# Patient Record
Sex: MALE | Race: ASIAN | NOT HISPANIC OR LATINO | URBAN - METROPOLITAN AREA
[De-identification: names, ages, dates, MRNs, and addresses within clinical notes are randomized per-mention and may not be internally consistent; named-entity substitution may affect disease eponyms.]

---

## 2017-07-24 ENCOUNTER — EMERGENCY (EMERGENCY)
Facility: HOSPITAL | Age: 22
LOS: 1 days | Discharge: HOME | End: 2017-07-24
Attending: EMERGENCY MEDICINE | Admitting: EMERGENCY MEDICINE
Payer: MEDICAID

## 2017-07-24 VITALS
TEMPERATURE: 98 F | OXYGEN SATURATION: 100 % | HEART RATE: 74 BPM | DIASTOLIC BLOOD PRESSURE: 73 MMHG | RESPIRATION RATE: 16 BRPM | SYSTOLIC BLOOD PRESSURE: 134 MMHG

## 2017-07-24 PROCEDURE — 10120 INC&RMVL FB SUBQ TISS SMPL: CPT | Mod: RT

## 2017-07-24 PROCEDURE — 76882 US LMTD JT/FCL EVL NVASC XTR: CPT | Mod: 26

## 2017-07-24 PROCEDURE — 99284 EMERGENCY DEPT VISIT MOD MDM: CPT | Mod: 25

## 2017-07-24 RX ORDER — CIPROFLOXACIN LACTATE 400MG/40ML
1 VIAL (ML) INTRAVENOUS
Qty: 10 | Refills: 0 | OUTPATIENT
Start: 2017-07-24 | End: 2017-07-29

## 2017-07-24 RX ORDER — TETANUS TOXOID, REDUCED DIPHTHERIA TOXOID AND ACELLULAR PERTUSSIS VACCINE, ADSORBED 5; 2.5; 8; 8; 2.5 [IU]/.5ML; [IU]/.5ML; UG/.5ML; UG/.5ML; UG/.5ML
0.5 SUSPENSION INTRAMUSCULAR ONCE
Qty: 0 | Refills: 0 | Status: COMPLETED | OUTPATIENT
Start: 2017-07-24 | End: 2017-07-24

## 2017-07-24 RX ADMIN — TETANUS TOXOID, REDUCED DIPHTHERIA TOXOID AND ACELLULAR PERTUSSIS VACCINE, ADSORBED 0.5 MILLILITER(S): 5; 2.5; 8; 8; 2.5 SUSPENSION INTRAMUSCULAR at 16:30

## 2017-07-24 NOTE — ED PROCEDURE NOTE - PROCEDURE ADDITIONAL DETAILS
[address], ultrasound, musculoskeletal, limited    Focused ED Ultrasound of plantar surface of right foot    Findings: linear hyperechoic foci    Impression: foreign body    Procedure note and images placed in chart
podiatry consulted after unsuccessful attempt

## 2017-07-24 NOTE — ED PROVIDER NOTE - PROGRESS NOTE DETAILS
DANIEL Landaverde - has not received tetanus shot and will today. as per podiatry, requesting ciprofloxacin abx as ppx, removed foreign body and placed stitches, recommending note for work for 2 weeks as pt should not be walking on foot. pt amenable for dc home.

## 2017-07-24 NOTE — CONSULT NOTE ADULT - ASSESSMENT
Pt seen and evaluated  Foot prepped with betadine, Inj of 3cc 1%lido w/ epi in local area block  using original incision was able to retrieve FB using sterile hemostat. Wound flushed with copious amounts of saline. Wound reprepped with betadine and wound reapproximated using sterile 3-0 nylon in simple suture technique. Wound dressed with betadine and DSD. Surgical shoe given to pt  Rec short course of PO ciprofloxacin (5Days)  Keep dressing clean dry and intact until follow up visit  Please follow up w/ Dr Knapp within 1 week of d/c [phone]  If worsening symptoms including signs of infection or excess pain please return to ED

## 2017-07-24 NOTE — CONSULT NOTE ADULT - SUBJECTIVE AND OBJECTIVE BOX
· Duration: day(s), 2	  · Timing: sudden onset	  · Laterality: right	  · Location: foot	  · Presenting Symptoms: PAIN	  · HPI Objective Statement: 22y M presents to the ED for splinter in bottom right foot x2 days. Pt was in [place] and stepped on wood, attempted removal at home but was unsuccessful. Attempted retreval in ED but unsuccessful. Pain. Denies discharge.	  	        PAST MEDICAL & SURGICAL HISTORY:      MEDICATIONS  (STANDING):  diphtheria/tetanus/pertussis (acellular) Vaccine (ADAcel) 0.5 milliLiter(s) IntraMuscular once    MEDICATIONS  (PRN):      Allergies    No Known Allergies    Intolerances        VITALS:    Vital Signs Last 24 Hrs  T(C): 36.4 (24 Jul 2017 14:00), Max: 36.4 (24 Jul 2017 14:00)  T(F): 97.6 (24 Jul 2017 14:00), Max: 97.6 (24 Jul 2017 14:00)  HR: 74 (24 Jul 2017 14:00) (74 - 74)  BP: 134/73 (24 Jul 2017 14:00) (134/73 - 134/73)  BP(mean): --  RR: 16 (24 Jul 2017 14:00) (16 - 16)  SpO2: 100% (24 Jul 2017 14:00) (100% - 100%)    LABS:                CAPILLARY BLOOD GLUCOSE              LOWER EXTREMITY PHYSICAL EXAM:    Vascular: DP/PT 2/4, B/L, CFT <3 seconds B/L, Temperature gradient normal B/L.   Neuro: Epicritic sensation intact B/L.  Musculoskeletal/Ortho: pain on palaption of RF sub met 1. palpable FB felt on exam deep to skin, no loss of function  Skin: 2cm open lesion from attempted FB retrieval, no drainage, no edema, no erythema, no signs of infection  :     RADIOLOGY & ADDITIONAL STUDIES:    US confirm location of FB in foot.

## 2017-07-24 NOTE — ED PROCEDURE NOTE - NS_ATTENDINGSCRIBE_ED_ALL_ED
I personally performed the service described in the documentation recorded by the scribe in my presence, and it accurately and completely records my words and actions.
I personally performed the service described in the documentation recorded by the scribe in my presence, and it accurately and completely records my words and actions.

## 2017-07-24 NOTE — ED PROVIDER NOTE - MEDICAL DECISION MAKING DETAILS
Pt with foreign body in foot. Will perform US to confirm. US shows foreign body. Will attempt to remove. Removal unsuccessful and will follow up with pediatry

## 2017-07-24 NOTE — ED PROVIDER NOTE - CARE PLAN
Principal Discharge DX:	Foot pain  Instructions for follow-up, activity and diet:	Follow up with podiatry Dr. Knapp in 1 week, call [phone] to make an appointment. Keep sutures covered and dry for 24 hours then clean with soap and water daily.  Apply bacitracin and cover. Take antibiotics Ciprofloxacin as prescribed to your pharmacy. Any increased pain, redness, streaking (red lines), swelling, fever, chills return to ER. Principal Discharge DX:	Foot pain  Instructions for follow-up, activity and diet:	Follow up with podiatry Dr. Knapp in 1 week, call [phone] to make an appointment. Keep sutures covered and dry for 24 hours then clean with soap and water daily.  Apply bacitracin and cover. Take antibiotics Ciprofloxacin as prescribed to your pharmacy. Any increased pain, redness, streaking (red lines), swelling, fever, chills return to ER. Principal Discharge DX:	Foot pain  Instructions for follow-up, activity and diet:	Follow up with podiatry Dr. Knapp in 1 week, call [phone] to make an appointment. Keep sutures covered and dry for 24 hours then clean with soap and water daily.  Apply bacitracin and cover. Take antibiotics Ciprofloxacin as prescribed to your pharmacy. Any increased pain, redness, streaking (red lines), swelling, fever, chills return to ER.

## 2019-11-19 ENCOUNTER — EMERGENCY (EMERGENCY)
Facility: HOSPITAL | Age: 24
LOS: 1 days | Discharge: HOME | End: 2019-11-19
Attending: EMERGENCY MEDICINE | Admitting: EMERGENCY MEDICINE
Payer: MEDICAID

## 2019-11-19 VITALS
TEMPERATURE: 98 F | RESPIRATION RATE: 16 BRPM | HEART RATE: 72 BPM | SYSTOLIC BLOOD PRESSURE: 123 MMHG | DIASTOLIC BLOOD PRESSURE: 80 MMHG | OXYGEN SATURATION: 100 %

## 2019-11-19 PROCEDURE — 99283 EMERGENCY DEPT VISIT LOW MDM: CPT

## 2019-11-19 PROCEDURE — 73590 X-RAY EXAM OF LOWER LEG: CPT | Mod: 26,RT

## 2019-11-19 RX ORDER — IBUPROFEN 200 MG
600 TABLET ORAL ONCE
Refills: 0 | Status: COMPLETED | OUTPATIENT
Start: 2019-11-19 | End: 2019-11-19

## 2019-11-19 RX ORDER — LIDOCAINE 4 G/100G
1 CREAM TOPICAL ONCE
Refills: 0 | Status: COMPLETED | OUTPATIENT
Start: 2019-11-19 | End: 2019-11-19

## 2019-11-19 RX ORDER — ACETAMINOPHEN 500 MG
650 TABLET ORAL ONCE
Refills: 0 | Status: COMPLETED | OUTPATIENT
Start: 2019-11-19 | End: 2019-11-19

## 2019-11-19 RX ADMIN — Medication 650 MILLIGRAM(S): at 18:27

## 2019-11-19 RX ADMIN — LIDOCAINE 1 PATCH: 4 CREAM TOPICAL at 18:28

## 2019-11-19 RX ADMIN — Medication 600 MILLIGRAM(S): at 18:27

## 2019-11-19 NOTE — ED PROVIDER NOTE - PROGRESS NOTE DETAILS
Jerry Moreno, PGY 2: patient's pain improved with medication, neg xray for stress fractures and will d/c home with pain control and orthopedic follow-up

## 2019-11-19 NOTE — ED PROVIDER NOTE - PATIENT PORTAL LINK FT
You can access the FollowMyHealth Patient Portal offered by [facility] by registering at the following website: [email or web address]. By joining [employer]’s FollowMyHealth portal, you will also be able to view your health information using other applications (apps) compatible with our system.

## 2019-11-19 NOTE — ED PROVIDER NOTE - ATTENDING CONTRIBUTION TO CARE
DR. LIN, ATTENDING MD-  I performed a face to face bedside interview with the patient regarding history of present illness, review of symptoms and past medical history. I completed an independent physical exam.  I have discussed the patient's plan of care with the resident.   Documentation as above in the note.    23 y/o male with right leg pain.  Walks many miles per day for work.  Evaluate for stress fx.  Obtain xr.

## 2019-11-19 NOTE — ED ADULT TRIAGE NOTE - CHIEF COMPLAINT QUOTE
LOV:4-  FUV: 7-    Last refill:  hydrochlorothiazide (HYDRODIURIL) 25 MG tablet 90 tablet 0 1/25/2018     Sig - Route: TAKE 1 TABLET BY MOUTH EVERY MORNING. - Oral        Refilled   Pt with right calf pain. Denies any long travel.

## 2019-11-19 NOTE — ED PROVIDER NOTE - NSFOLLOWUPINSTRUCTIONS_ED_ALL_ED_FT
Thank you for visiting our Emergency Department, it has been a pleasure taking part in your healthcare. Please follow up with your primary doctor within x48 hours.    Your discharge diagnosis is: leg pain  Please take over the counter pain medication such as motrin and tylenol for pain and follow up with your primary care doctor.     Return precautions to the Emergency Department include but are not limited to: unrelenting nausea, vomiting, fever, chills, chest pain, shortness of breath, dizziness, abdominal pain, worsening pain, syncope, blood in urine or stool, headache that doesn't resolve, numbness or tingling, loss of sensation, loss of motor function, or any other concerning symptoms.

## 2019-11-19 NOTE — ED PROVIDER NOTE - PHYSICAL EXAMINATION
.msk: No sign of compartment syndrome of the LE, DP/PT pulses equal and b/l, no sign of the infection, swelling of the lateral aspect of the right calf.   TTP of the lateral aspect of the right calf. skin temp equal and b/l in all extremities. sensation intact in LE, FROM of the LE.

## 2019-11-19 NOTE — ED PROVIDER NOTE - OBJECTIVE STATEMENT
23 y/o M with no significant PMHx presents to ED with R leg pain and R foot pain since yesterday. Pt states that he walks a lot for his job and approximately walks 7 to 8 miles a day for work. No similar history of pain in the past. Pain worsened by walking. Denies having any trauma, fall, numbness, tingling, weakness, or other medical complaints. Pt has not taken any pain medications at this time. 23 y/o M with no significant PMHx presents to ED with R leg pain and R foot pain since yesterday. Pt states that he walks a lot for his job and approximately walks 7 to 8 miles a day for work. No similar history of pain in the past. Pain worsened by walking. Denies having any trauma, fall, numbness, tingling, weakness, or other medical complaints. Pt has not taken any pain medications at this time. No trauma to the LE reported.

## 2024-06-26 NOTE — ED PROVIDER NOTE - LATERALITY
Follow-up closely with primary care provider in the following 1 to 2 days.    Follow-up closely with your cardiologist as well.   right

## 2025-04-29 NOTE — ED PROVIDER NOTE - CLINICAL SUMMARY MEDICAL DECISION MAKING FREE TEXT BOX
Pt peripheral IV removed without complications. Reviewed discharge instructions with patient including medications, follow up appointments and when to call the doctor. Pt verbalized understanding. Pt has no further questions or concerns at this time. Stable on discharge.      acute right leg pain, no trauma no sign of infection, will get symptomatic treatment. acute right leg pain, no trauma no sign of infection, neg physical exam for compartment syndrome. FROM, will get xray for possible stress fx, will get symptomatic treatment otherwise and ortho follow-up